# Patient Record
Sex: MALE | Race: WHITE
[De-identification: names, ages, dates, MRNs, and addresses within clinical notes are randomized per-mention and may not be internally consistent; named-entity substitution may affect disease eponyms.]

---

## 2020-02-18 ENCOUNTER — HOSPITAL ENCOUNTER (OUTPATIENT)
Dept: HOSPITAL 92 - TBSIIMAG | Age: 55
Discharge: HOME | End: 2020-02-18
Attending: NEUROLOGICAL SURGERY
Payer: COMMERCIAL

## 2020-02-18 DIAGNOSIS — M54.2: Primary | ICD-10-CM

## 2020-02-18 DIAGNOSIS — M46.92: ICD-10-CM

## 2020-02-18 PROCEDURE — 72040 X-RAY EXAM NECK SPINE 2-3 VW: CPT

## 2020-02-18 NOTE — RAD
CERVICAL SPINE SERIES TWO VIEWS:

2/18/20

 

HISTORY: 

Neck pain. 

 

Vertebral bodies are normal in height. There is degenerative disc narrowing at the C6-7 level. Minima
l anterolisthesis of C4 on C6 is noted. Not appreciably changed in between the flexion and extension 
views. There are arthritic changes of the facet joints. 

 

IMPRESSION: 

Arthritic changes of the spine. 

 

POS: ELISEO